# Patient Record
Sex: MALE | Race: BLACK OR AFRICAN AMERICAN | RURAL
[De-identification: names, ages, dates, MRNs, and addresses within clinical notes are randomized per-mention and may not be internally consistent; named-entity substitution may affect disease eponyms.]

---

## 2021-03-21 ENCOUNTER — PREPPED CHART (OUTPATIENT)
Dept: RURAL CLINIC 3 | Facility: CLINIC | Age: 81
End: 2021-03-21

## 2021-03-23 ENCOUNTER — IMPORTED ENCOUNTER (OUTPATIENT)
Dept: URBAN - NONMETROPOLITAN AREA CLINIC 1 | Facility: CLINIC | Age: 81
End: 2021-03-23

## 2021-03-23 PROCEDURE — 92015 DETERMINE REFRACTIVE STATE: CPT

## 2021-03-23 PROCEDURE — 92004 COMPRE OPH EXAM NEW PT 1/>: CPT

## 2021-03-23 NOTE — PATIENT DISCUSSION
Hyperopia-Discussed diagnosis with patient. Astigmatism-Discussed diagnosis with patient. Presbyopia-Discussed diagnosis with patient. POAG OU Unkown stage - Patient has previously been on Timolol BID OU. Recommend restart Timolol BID OU. Rx sent to pharmacy. -Recommend return for F/U POAG OU for further evaluation in 3 months. *****Updated spec Rx given. Recommend lens that will provide comfort as well as protect safety and health of eyes. Pseudophakia OD-Doing well following previous CE IOL OD.-Recommend observation. Cataract OS-Not yet surgical. -Reviewed symptoms of advancing cataract growth such as glare and halos and decreased vision.-Continue to monitor for now. Pt will notify us if any new symptoms develop.

## 2021-03-24 PROBLEM — H52.03: Noted: 2021-03-24

## 2021-03-24 PROBLEM — Z96.1: Noted: 2021-03-23

## 2021-03-24 PROBLEM — H52.4: Noted: 2021-03-24

## 2021-03-24 PROBLEM — H52.223: Noted: 2021-03-24

## 2021-03-24 PROBLEM — H25.12: Noted: 2021-03-23

## 2022-03-23 ASSESSMENT — TONOMETRY
OS_IOP_MMHG: 22
OD_IOP_MMHG: 22

## 2022-03-29 ENCOUNTER — FOLLOW UP (OUTPATIENT)
Dept: RURAL CLINIC 3 | Facility: CLINIC | Age: 82
End: 2022-03-29

## 2022-03-29 DIAGNOSIS — H40.1134: ICD-10-CM

## 2022-03-29 PROCEDURE — 92133 CPTRZD OPH DX IMG PST SGM ON: CPT

## 2022-03-29 PROCEDURE — 99214 OFFICE O/P EST MOD 30 MIN: CPT

## 2022-03-29 ASSESSMENT — VISUAL ACUITY
OD_SC: 20/200
OS_PH: 20/20-1
OD_PH: 20/70
OS_SC: 20/40-1

## 2022-03-29 ASSESSMENT — TONOMETRY
OD_IOP_MMHG: 30
OS_IOP_MMHG: 26

## 2022-03-29 NOTE — PATIENT DISCUSSION
Discussed diagnosis in detail with patient. Reviewed symptoms related to cataract progression. Recommend refer to Dr. Vicki Paris for cataract evaluation. Patient elects to schedule.

## 2022-03-29 NOTE — PATIENT DISCUSSION
Patient has not been using any glaucoma drops in a while per patient. IOP increased to 30 OD and 26 OS. Start Lumigan QHS OU, sample given today.  Patient to return for VF N/A.

## 2022-04-15 ASSESSMENT — VISUAL ACUITY
OD_SC: 20/30-2
OS_SC: 20/30-2

## 2022-04-15 ASSESSMENT — TONOMETRY
OD_IOP_MMHG: 22
OS_IOP_MMHG: 22

## 2022-05-24 ENCOUNTER — FOLLOW UP (OUTPATIENT)
Dept: RURAL CLINIC 3 | Facility: CLINIC | Age: 82
End: 2022-05-24

## 2022-05-24 DIAGNOSIS — H40.1121: ICD-10-CM

## 2022-05-24 DIAGNOSIS — H40.1113: ICD-10-CM

## 2022-05-24 PROCEDURE — 99214 OFFICE O/P EST MOD 30 MIN: CPT

## 2022-05-24 PROCEDURE — 92083 EXTENDED VISUAL FIELD XM: CPT

## 2022-05-24 ASSESSMENT — VISUAL ACUITY
OS_CC: 20/30-1
OD_PH: 20/50-2
OD_CC: 20/80-1

## 2022-05-24 ASSESSMENT — TONOMETRY
OS_IOP_MMHG: 14
OD_IOP_MMHG: 14

## 2022-05-24 NOTE — PATIENT DISCUSSION
Discussed diagnosis in detail with patient. Patient d/c Lumigan due to cloudy vision. VF done today; baseline with superior/inferior arcuate. Start Vyzulta QHS OU, sample given today and Rx sent to pharmacy. Continue to monitor.

## 2022-05-24 NOTE — PATIENT DISCUSSION
Discussed diagnosis in detail with patient. Reviewed symptoms related to cataract progression. Recommend refer to Dr. Lezlie Siemens for cataract evaluation. Patient elects to schedule.

## 2022-05-24 NOTE — PATIENT DISCUSSION
Discussed diagnosis in detail with patient. Patient d/c Lumigan due to cloudy vision. VF done today; baseline with non specific defects. Start Vyzulta QHS OU, sample given today and Rx sent to pharmacy. Continue to monitor.

## 2022-06-20 ENCOUNTER — CONSULTATION/EVALUATION (OUTPATIENT)
Dept: RURAL CLINIC 3 | Facility: CLINIC | Age: 82
End: 2022-06-20

## 2022-06-20 DIAGNOSIS — H25.12: ICD-10-CM

## 2022-06-20 DIAGNOSIS — Z01.818: ICD-10-CM

## 2022-06-20 PROCEDURE — 99214 OFFICE O/P EST MOD 30 MIN: CPT

## 2022-06-20 ASSESSMENT — VISUAL ACUITY
OS_CC: 20/40
OD_CC: 20/40
OD_SC: 20/80
OS_AM: 20/20
OS_CC: 20/30
OS_BAT: 20/70
OS_SC: 20/70-2

## 2022-06-20 ASSESSMENT — TONOMETRY
OS_IOP_MMHG: 20
OD_IOP_MMHG: 21

## 2022-06-20 NOTE — PATIENT DISCUSSION
(Surgical) Visually Significant (secondary to glare), discussed the risks, benefits, alternatives, and limitations of cataract surgery. The patient stated a full understanding and a desire to proceed with the procedure. The patient will need to return for pre-op appointment with cataract measurements and to have any additional questions answered, and start pre-operative eye drops as directed.  Pt elects to proceed with Stand/Trad OS.

## 2022-07-21 ENCOUNTER — SURGERY/PROCEDURE (OUTPATIENT)
Dept: RURAL CLINIC 4 | Facility: CLINIC | Age: 82
End: 2022-07-21

## 2022-07-21 ENCOUNTER — POST-OP (OUTPATIENT)
Dept: RURAL CLINIC 3 | Facility: CLINIC | Age: 82
End: 2022-07-21

## 2022-07-21 DIAGNOSIS — H25.12: ICD-10-CM

## 2022-07-21 DIAGNOSIS — Z98.42: ICD-10-CM

## 2022-07-21 PROCEDURE — 68841 INSJ RX ELUT IMPLT LAC CANAL: CPT

## 2022-07-21 PROCEDURE — 99024 POSTOP FOLLOW-UP VISIT: CPT

## 2022-07-21 PROCEDURE — 66984 XCAPSL CTRC RMVL W/O ECP: CPT

## 2022-07-21 ASSESSMENT — TONOMETRY
OD_IOP_MMHG: 14
OS_IOP_MMHG: 14

## 2022-07-21 ASSESSMENT — VISUAL ACUITY
OD_PH: 20/50-2
OS_SC: 20/90
OD_SC: 20/90

## 2022-07-21 NOTE — PATIENT DISCUSSION
Discussed diagnosis in detail with patient. Patient d/c Lumigan due to cloudy vision. VF done previously; baseline with superior/inferior arcuate. Neo Galvez was too expensive. Sample given today of Neo Galvez. Rx for Latanoprost sent to pharmacy. Continue to monitor.

## 2022-07-27 ENCOUNTER — POST-OP (OUTPATIENT)
Dept: RURAL CLINIC 3 | Facility: CLINIC | Age: 82
End: 2022-07-27

## 2022-07-27 DIAGNOSIS — Z98.42: ICD-10-CM

## 2022-07-27 PROCEDURE — 99024 POSTOP FOLLOW-UP VISIT: CPT

## 2022-07-27 ASSESSMENT — TONOMETRY
OS_IOP_MMHG: 14
OD_IOP_MMHG: 16

## 2022-07-27 ASSESSMENT — VISUAL ACUITY
OS_PH: 20/30
OS_SC: 20/50-1
OD_SC: 20/70-1
OD_PH: 20/50

## 2022-07-27 NOTE — PATIENT DISCUSSION
Discussed diagnosis in detail with patient. Patient d/c Lumigan due to cloudy vision. VF done previously; baseline with superior/inferior arcuate. Andry Vin was too expensive. Sample given today of Andry Banuelos. Continue Latanoprost QHS OU. Continue to monitor.

## 2022-09-06 ENCOUNTER — POST-OP (OUTPATIENT)
Dept: RURAL CLINIC 3 | Facility: CLINIC | Age: 82
End: 2022-09-06

## 2022-09-06 DIAGNOSIS — Z98.42: ICD-10-CM

## 2022-09-06 DIAGNOSIS — H52.4: ICD-10-CM

## 2022-09-06 PROCEDURE — 99024 POSTOP FOLLOW-UP VISIT: CPT

## 2022-09-06 PROCEDURE — 92015 DETERMINE REFRACTIVE STATE: CPT

## 2022-09-06 ASSESSMENT — VISUAL ACUITY
OS_PH: 20/30
OS_SC: 20/40-2
OD_PH: 20/50
OD_SC: 20/70-2

## 2022-09-06 ASSESSMENT — TONOMETRY
OD_IOP_MMHG: 18
OS_IOP_MMHG: 18

## 2022-09-06 NOTE — PATIENT DISCUSSION
Discussed diagnosis in detail with patient. Patient d/c Lumigan due to cloudy vision. VF done previously; baseline with superior/inferior arcuate. [facility] was too expensive. Continue Latanoprost QHS OU. Continue to monitor.

## 2023-01-19 ENCOUNTER — FOLLOW UP (OUTPATIENT)
Dept: RURAL CLINIC 3 | Facility: CLINIC | Age: 83
End: 2023-01-19

## 2023-01-19 DIAGNOSIS — Z96.1: ICD-10-CM

## 2023-01-19 DIAGNOSIS — H40.1113: ICD-10-CM

## 2023-01-19 DIAGNOSIS — H40.1122: ICD-10-CM

## 2023-01-19 PROCEDURE — 99214 OFFICE O/P EST MOD 30 MIN: CPT

## 2023-01-19 PROCEDURE — 92250 FUNDUS PHOTOGRAPHY W/I&R: CPT

## 2023-01-19 ASSESSMENT — TONOMETRY
OD_IOP_MMHG: 25
OS_IOP_MMHG: 14

## 2023-01-19 ASSESSMENT — VISUAL ACUITY
OD_CC: 20/40
OS_CC: 20/30-2
OD_PH: 20/25-1

## 2023-01-19 NOTE — PATIENT DISCUSSION
Discussed diagnosis in detail with patient. Patient d/c Lumigan due to cloudy vision. VF done previously; baseline with superior/inferior arcuate. Ardena Quails was too expensive. Continue Latanoprost QHS OU. Continue to monitor.

## 2024-05-02 ENCOUNTER — FOLLOW UP (OUTPATIENT)
Dept: RURAL CLINIC 3 | Facility: CLINIC | Age: 84
End: 2024-05-02

## 2024-05-02 DIAGNOSIS — H52.4: ICD-10-CM

## 2024-05-02 DIAGNOSIS — H40.1113: ICD-10-CM

## 2024-05-02 DIAGNOSIS — H40.1122: ICD-10-CM

## 2024-05-02 DIAGNOSIS — Z96.1: ICD-10-CM

## 2024-05-02 PROCEDURE — 92250 FUNDUS PHOTOGRAPHY W/I&R: CPT

## 2024-05-02 PROCEDURE — 92015 DETERMINE REFRACTIVE STATE: CPT

## 2024-05-02 PROCEDURE — 92014 COMPRE OPH EXAM EST PT 1/>: CPT

## 2024-05-02 ASSESSMENT — TONOMETRY
OD_IOP_MMHG: 15
OS_IOP_MMHG: 15

## 2024-05-02 ASSESSMENT — VISUAL ACUITY
OS_CC: 20/30-1
OD_CC: 20/40

## 2024-08-28 ENCOUNTER — EMERGENCY VISIT (OUTPATIENT)
Dept: RURAL CLINIC 3 | Facility: CLINIC | Age: 84
End: 2024-08-28

## 2024-08-28 DIAGNOSIS — H10.423: ICD-10-CM

## 2024-08-28 PROCEDURE — 99213 OFFICE O/P EST LOW 20 MIN: CPT

## 2024-08-28 ASSESSMENT — VISUAL ACUITY
OS_CC: 20/30-2
OD_CC: 20/50

## 2024-08-28 ASSESSMENT — TONOMETRY
OD_IOP_MMHG: 16
OS_IOP_MMHG: 14

## 2024-11-06 ENCOUNTER — FOLLOW UP (OUTPATIENT)
Dept: RURAL CLINIC 3 | Facility: CLINIC | Age: 84
End: 2024-11-06

## 2024-11-06 DIAGNOSIS — H40.1122: ICD-10-CM

## 2024-11-06 DIAGNOSIS — Z96.1: ICD-10-CM

## 2024-11-06 DIAGNOSIS — H10.423: ICD-10-CM

## 2024-11-06 DIAGNOSIS — H40.1113: ICD-10-CM

## 2024-11-06 PROCEDURE — 92083 EXTENDED VISUAL FIELD XM: CPT

## 2024-11-06 PROCEDURE — 92133 CPTRZD OPH DX IMG PST SGM ON: CPT

## 2024-11-06 PROCEDURE — 99214 OFFICE O/P EST MOD 30 MIN: CPT

## 2025-05-06 ENCOUNTER — FOLLOW UP (OUTPATIENT)
Age: 85
End: 2025-05-06

## 2025-05-06 DIAGNOSIS — H40.1113: ICD-10-CM

## 2025-05-06 DIAGNOSIS — H10.423: ICD-10-CM

## 2025-05-06 DIAGNOSIS — Z96.1: ICD-10-CM

## 2025-05-06 DIAGNOSIS — H40.1122: ICD-10-CM

## 2025-05-06 DIAGNOSIS — H52.4: ICD-10-CM

## 2025-05-06 PROCEDURE — 92250 FUNDUS PHOTOGRAPHY W/I&R: CPT

## 2025-05-06 PROCEDURE — 92015 DETERMINE REFRACTIVE STATE: CPT

## 2025-05-06 PROCEDURE — 92014 COMPRE OPH EXAM EST PT 1/>: CPT
